# Patient Record
Sex: MALE | Race: ASIAN | NOT HISPANIC OR LATINO | Employment: FULL TIME | URBAN - METROPOLITAN AREA
[De-identification: names, ages, dates, MRNs, and addresses within clinical notes are randomized per-mention and may not be internally consistent; named-entity substitution may affect disease eponyms.]

---

## 2023-06-09 ENCOUNTER — ANCILLARY PROCEDURE (OUTPATIENT)
Dept: GENERAL RADIOLOGY | Facility: CLINIC | Age: 28
End: 2023-06-09
Attending: PHYSICIAN ASSISTANT

## 2023-06-09 ENCOUNTER — OFFICE VISIT (OUTPATIENT)
Dept: URGENT CARE | Facility: URGENT CARE | Age: 28
End: 2023-06-09

## 2023-06-09 VITALS
DIASTOLIC BLOOD PRESSURE: 112 MMHG | SYSTOLIC BLOOD PRESSURE: 165 MMHG | TEMPERATURE: 97.4 F | OXYGEN SATURATION: 100 % | WEIGHT: 249 LBS | HEART RATE: 75 BPM | RESPIRATION RATE: 16 BRPM

## 2023-06-09 DIAGNOSIS — M54.50 ACUTE RIGHT-SIDED LOW BACK PAIN, UNSPECIFIED WHETHER SCIATICA PRESENT: Primary | ICD-10-CM

## 2023-06-09 DIAGNOSIS — R20.2 BILATERAL LEG PARESTHESIA: ICD-10-CM

## 2023-06-09 DIAGNOSIS — R03.0 ELEVATED BLOOD PRESSURE READING WITHOUT DIAGNOSIS OF HYPERTENSION: ICD-10-CM

## 2023-06-09 DIAGNOSIS — M54.50 ACUTE RIGHT-SIDED LOW BACK PAIN, UNSPECIFIED WHETHER SCIATICA PRESENT: ICD-10-CM

## 2023-06-09 PROCEDURE — 72100 X-RAY EXAM L-S SPINE 2/3 VWS: CPT | Mod: TC | Performed by: RADIOLOGY

## 2023-06-09 PROCEDURE — 99203 OFFICE O/P NEW LOW 30 MIN: CPT | Performed by: PHYSICIAN ASSISTANT

## 2023-06-09 RX ORDER — METHOCARBAMOL 500 MG/1
500 TABLET, FILM COATED ORAL
Qty: 20 TABLET | Refills: 0 | Status: SHIPPED | OUTPATIENT
Start: 2023-06-09 | End: 2023-06-29

## 2023-06-09 RX ORDER — METHYLPREDNISOLONE 4 MG
TABLET, DOSE PACK ORAL
Qty: 21 TABLET | Refills: 0 | Status: SHIPPED | OUTPATIENT
Start: 2023-06-09 | End: 2024-07-26

## 2023-06-09 ASSESSMENT — PAIN SCALES - GENERAL: PAINLEVEL: SEVERE PAIN (6)

## 2023-06-09 NOTE — PROGRESS NOTES
X-rays-I see some angulation convexity at the right L1-2 level.  Lateral shows bone spurs at multiple levels with degenerative changes.      Results for orders placed or performed in visit on 06/09/23   XR Lumbar Spine 2/3 Views     Status: None (Preliminary result)    Narrative    LUMBAR SPINE 2/3 VIEWS 6/9/2023 11:44 AM    INDICATION: Bilateral leg paresthesia; Acute right-sided low back  pain, unspecified whether sciatica present.    COMPARISON: None      Impression    IMPRESSION: Five lumbar vertebral bodies. Chronic appearing vertebral  body height loss at L1 and L2 where there is convex right angulation  without subluxation. No definite acute fracture. Mild to moderate  interspace and endplate degeneration at T12-L1 and L1-L2. Mild  interspace and endplate degeneration L2-L3 and L3-L4. Unremarkable  facets.         Impression:     ICD-10-CM    1. Acute right-sided low back pain, unspecified whether sciatica present  M54.50 methylPREDNISolone (MEDROL DOSEPAK) 4 MG tablet therapy pack     methocarbamol (ROBAXIN) 500 MG tablet     XR Lumbar Spine 2/3 Views     Orthopedic  Referral     CANCELED: Orthopedic  Referral      2. Bilateral leg paresthesia  R20.2 methylPREDNISolone (MEDROL DOSEPAK) 4 MG tablet therapy pack     methocarbamol (ROBAXIN) 500 MG tablet     XR Lumbar Spine 2/3 Views     Orthopedic  Referral     CANCELED: Orthopedic  Referral      3. Elevated blood pressure reading without diagnosis of hypertension  R03.0             Plan: Acute right sided low back pain with some bilateral leg paresthesias down to the anterior shins over the last 2 days.  Some intermittent low back pain since September 2022.  Neurologically intact.  Differential includes but is not limited to facet syndrome, disc bulge, disc herniation, stenosis, lumbar sprain/strain.  Medrol Dosepak, muscle relaxant at bedtime.  Referral for follow-up with Ortho in 1 to 2 weeks.  Instructions for back  care and return precautions discussed.      Elevated blood pressure.  Forgot to recheck.  I called patient with x-ray results and told him to be sure to follow-up.  Ortho can determine whether or not more advanced imaging is needed such as MRI.  Told him I forgot to recheck his blood pressure here today and that it was extremely elevated.  He states he has been told in the past that his blood pressure is elevated.  He denies any headaches or dizziness.  I told him to get his blood pressure rechecked outside of clinic, preferably when not on the Medrol Dosepak/steroid, and if still elevated he needs to obtain primary care provider and follow-up for it.  Explained that hypertension is the #1 cause of stroke.  He seems to understand the importance of it.     Margarita Khan PA-C      SUBJECTIVE:  37 yo male presents acute right-sided low back pain with paresthesias both legs for the past 2 days.  He has had some intermittent right-sided low back pain since September 2022 but it has been worse over the last month with new onset of the leg symptoms over the past 2 days.  Aleve does help somewhat.  Massage really did not help.  No specific injury.  Is a  and does bending, twisting, lifting all day long.  No night sweats, fever, weight loss.  No bowel or bladder trouble.  He can tell when he has to go to the bathroom and is able to get there on time.  He gets tingling in the lateral thighs and and anterior shins.  Pain is worse with lifting, bending, twisting or lying flat on his back.        Not on File    No past medical history on file.    No current outpatient medications on file prior to visit.  No current facility-administered medications on file prior to visit.      No past surgical history on file.    Social History     Socioeconomic History     Marital status: Single     Spouse name: Not on file     Number of children: Not on file     Years of education: Not on file     Highest education level:  Not on file   Occupational History     Not on file   Tobacco Use     Smoking status: Not on file     Smokeless tobacco: Not on file   Substance and Sexual Activity     Alcohol use: Not on file     Drug use: Not on file     Sexual activity: Not on file   Other Topics Concern     Not on file   Social History Narrative     Not on file     Social Determinants of Health     Financial Resource Strain: Not on file   Food Insecurity: Not on file   Transportation Needs: Not on file   Physical Activity: Not on file   Stress: Not on file   Social Connections: Not on file   Intimate Partner Violence: Not on file   Housing Stability: Not on file       REVIEW OF SYSTEMS  General: negative for fever  Resp: negative for chest pain   CV: negative for chest pain  : negative for dysuria , incontinence, frequency  Musculoskeletal: as above  Neurologic: negative for ataxia, saddle anesthesia, fecal incontinence, one sided weakness,  paresthesias    Physical Exam:  BP (!) 165/112   Pulse 75   Temp 97.4  F (36.3  C) (Tympanic)   Resp 16   Wt 112.9 kg (249 lb)   SpO2 100%     Constitutional: healthy, alert and no acute distress   CARDIO: RRR, no MRG  RESP: lungs CTA, NAD  NEURO: Patellar  and ankle reflexes intact and equal bilaterally  BACK:  Straight leg raise intact, mild localized tenderness right  L1-L3 facet area.  Full flexion and extension with increased pain with extension. Strength intact and equal bilateral lower extremities.  Negative Barbra's test.  GAIT: intact  Psychiatric: mentation appears normal and affect normal/bright        Margarita Khan PA-C

## 2023-06-09 NOTE — LETTER
June 9, 2023      Rolando Olvera  [address]  [facility] [address]        To Whom It May Concern:    Rolando Olvera was seen in our clinic. He may return to work 6/12 without restrictions. Please excuse today and 6/11/2023.      Sincerely,        Margarita Khan PA-C

## 2023-06-12 ENCOUNTER — TELEPHONE (OUTPATIENT)
Dept: NEUROSURGERY | Facility: CLINIC | Age: 28
End: 2023-06-12

## 2023-06-12 NOTE — TELEPHONE ENCOUNTER
Placed call to pt who reports slight pain to back side. No N/T to genitals, no issues with bowel or bladder.     Pt to remain scheduled as is. No other recommendations at this time.

## 2023-06-12 NOTE — TELEPHONE ENCOUNTER
Hello,  Pt answered Yes to this question.  Since you last saw your referring provider, have you had any major or unusual changes in your bowel or bladder function or any numbness or tingling in your genital area?  I dont have more information.      Hello,  I'm with ortho con.  Pt has an appt on Wed with Tila.  Pt reports RED flag symptoms saying Yes and No so I'm reporting it.  Please review.  Thank you.

## 2023-06-14 ENCOUNTER — OFFICE VISIT (OUTPATIENT)
Dept: NEUROSURGERY | Facility: CLINIC | Age: 28
End: 2023-06-14

## 2023-06-14 VITALS — DIASTOLIC BLOOD PRESSURE: 104 MMHG | HEART RATE: 55 BPM | OXYGEN SATURATION: 99 % | SYSTOLIC BLOOD PRESSURE: 147 MMHG

## 2023-06-14 DIAGNOSIS — M54.50 ACUTE RIGHT-SIDED LOW BACK PAIN, UNSPECIFIED WHETHER SCIATICA PRESENT: ICD-10-CM

## 2023-06-14 DIAGNOSIS — R20.2 BILATERAL LEG PARESTHESIA: ICD-10-CM

## 2023-06-14 PROCEDURE — 99203 OFFICE O/P NEW LOW 30 MIN: CPT | Performed by: PHYSICIAN ASSISTANT

## 2023-06-14 ASSESSMENT — PAIN SCALES - GENERAL: PAINLEVEL: MILD PAIN (3)

## 2023-06-14 NOTE — LETTER
6/14/2023         RE: Rolando Olvera  [address]  [facility] [address]        Dear Colleague,    Thank you for referring your patient, Rolando Olvera, to the [facility]. Please see a copy of my visit note below.    Neurosurgery Consult    HPI    Mr. Olvera is a 28-year-old male who presents to clinic for a 5-day history of back pain and some left leg tingling.  The symptoms have mostly resolved now that he has been on a Medrol Dosepak and just finished today.  He denies any weakness or any residual numbness at this time, his back pain is almost gone.  Denies any bowel or bladder symptoms.  He does not do any physical therapy.  He had a lumbar x-ray.    Medical history  Obesity    Social history  Works as an [employer]       B/P: 147/104, T: Data Unavailable, P: 55, R: Data Unavailable       Exam    Alert and oriented no acute distress  Bilateral lower extremities with 5/5 strength  Reflexes 2+ patella/ankle  Negative straight leg raise bilaterally  Negative ankle clonus negative Babinski bilaterally  Lumbar spine nontender to palpation  Able to stand on heels and toes  Gait is normal    Imaging    Lumbar x-ray demonstrated    IMPRESSION: Five lumbar vertebral bodies. Chronic appearing vertebral  body height loss at L1 and L2 where there is convex right angulation  without subluxation. No definite acute fracture. Mild to moderate  interspace and endplate degeneration at T12-L1 and L1-L2. Mild  interspace and endplate degeneration L2-L3 and L3-L4. Unremarkable  facets.    Assessment    Back pain and left leg radicular symptoms    Plan:      Given the symptoms are nearly resolved, no further therapy or follow-up is needed at this time, if his symptoms return over the next few weeks when he is off the steroids, physical therapy would certainly be reasonable, or if he is having worsening radicular symptoms then an MRI may be obtained.          Again, thank you for allowing me  to participate in the care of your patient.        Sincerely,        Tila Cleary PA-C

## 2023-06-14 NOTE — NURSING NOTE
Rolando Olvera is a 28 year old male who presents for:  Chief Complaint   Patient presents with     Consult     Acute right sided low back pain         Initial Vitals:  BP (!) 147/104   Pulse 55   SpO2 99%  There is no height or weight on file to calculate BMI.. There is no height or weight on file to calculate BSA. BP completed using cuff size: large  Mild Pain (3)    Nursing Comments:     Cristo Garcia MA

## 2023-06-14 NOTE — PROGRESS NOTES
Neurosurgery Consult    HPI    Mr. Olvera is a 28-year-old male who presents to clinic for a 5-day history of back pain and some left leg tingling.  The symptoms have mostly resolved now that he has been on a Medrol Dosepak and just finished today.  He denies any weakness or any residual numbness at this time, his back pain is almost gone.  Denies any bowel or bladder symptoms.  He does not do any physical therapy.  He had a lumbar x-ray.    Medical history  Obesity    Social history  Works as an [employer]       B/P: 147/104, T: Data Unavailable, P: 55, R: Data Unavailable       Exam    Alert and oriented no acute distress  Bilateral lower extremities with 5/5 strength  Reflexes 2+ patella/ankle  Negative straight leg raise bilaterally  Negative ankle clonus negative Babinski bilaterally  Lumbar spine nontender to palpation  Able to stand on heels and toes  Gait is normal    Imaging    Lumbar x-ray demonstrated    IMPRESSION: Five lumbar vertebral bodies. Chronic appearing vertebral  body height loss at L1 and L2 where there is convex right angulation  without subluxation. No definite acute fracture. Mild to moderate  interspace and endplate degeneration at T12-L1 and L1-L2. Mild  interspace and endplate degeneration L2-L3 and L3-L4. Unremarkable  facets.    Assessment    Back pain and left leg radicular symptoms    Plan:      Given the symptoms are nearly resolved, no further therapy or follow-up is needed at this time, if his symptoms return over the next few weeks when he is off the steroids, physical therapy would certainly be reasonable, or if he is having worsening radicular symptoms then an MRI may be obtained.

## 2023-08-13 ENCOUNTER — HEALTH MAINTENANCE LETTER (OUTPATIENT)
Age: 28
End: 2023-08-13

## 2024-06-06 ENCOUNTER — OFFICE VISIT (OUTPATIENT)
Dept: URGENT CARE | Facility: URGENT CARE | Age: 29
End: 2024-06-06
Payer: COMMERCIAL

## 2024-06-06 VITALS
HEART RATE: 90 BPM | RESPIRATION RATE: 16 BRPM | SYSTOLIC BLOOD PRESSURE: 161 MMHG | DIASTOLIC BLOOD PRESSURE: 111 MMHG | TEMPERATURE: 97.9 F | WEIGHT: 270.6 LBS | OXYGEN SATURATION: 96 %

## 2024-06-06 DIAGNOSIS — M25.571 ACUTE RIGHT ANKLE PAIN: Primary | ICD-10-CM

## 2024-06-06 PROCEDURE — 84550 ASSAY OF BLOOD/URIC ACID: CPT | Performed by: PHYSICIAN ASSISTANT

## 2024-06-06 PROCEDURE — 99213 OFFICE O/P EST LOW 20 MIN: CPT | Performed by: PHYSICIAN ASSISTANT

## 2024-06-06 PROCEDURE — 36415 COLL VENOUS BLD VENIPUNCTURE: CPT | Performed by: PHYSICIAN ASSISTANT

## 2024-06-06 RX ORDER — COLCHICINE 0.6 MG/1
0.6 TABLET ORAL DAILY
Qty: 30 TABLET | Refills: 0 | Status: SHIPPED | OUTPATIENT
Start: 2024-06-06 | End: 2024-07-26

## 2024-06-06 ASSESSMENT — ENCOUNTER SYMPTOMS
COUGH: 0
GASTROINTESTINAL NEGATIVE: 1
HEADACHES: 0
VOMITING: 0
MYALGIAS: 0
PALPITATIONS: 0
CONSTITUTIONAL NEGATIVE: 1
DIARRHEA: 0
DYSURIA: 0
SHORTNESS OF BREATH: 0
FEVER: 0
WHEEZING: 0
CHILLS: 0
RESPIRATORY NEGATIVE: 1
SORE THROAT: 0
FREQUENCY: 0
NEUROLOGICAL NEGATIVE: 1
CHEST TIGHTNESS: 0
NAUSEA: 0
HEMATURIA: 0
ARTHRALGIAS: 1
ABDOMINAL PAIN: 0
ALLERGIC/IMMUNOLOGIC NEGATIVE: 1
CARDIOVASCULAR NEGATIVE: 1

## 2024-06-06 ASSESSMENT — PAIN SCALES - GENERAL: PAINLEVEL: MODERATE PAIN (4)

## 2024-06-06 NOTE — PROGRESS NOTES
Chief Complaint:     Chief Complaint   Patient presents with    Ankle Pain     Patient reports right ankle pain beginning when he woke up on Monday morning.      ASSESSMENT     1. Acute right ankle pain       PLAN    Will get uric acid today.  Rx for Colchicine sent in.  RICE discussed  Recommended rest and avoidance of activities which cause pain or swelling.  Pain relief: Acetaminophen and or Ibuprofen with food.  Patient verbalized understanding, and agrees with this plan.    HPI: Rolando Olvera is an 29 year old male who presents today for evaluation of R ankle pain.  Patient woke up with the pain 3 days ago.  He does not recall any acute injury.  He has not tried anything for the pain.  Pain is worse with weight bearing.  He is able to walk on the R ankle.      Patient denies any numbness, tingling, or dysfunction of the R leg.    ROS:      Review of Systems   Constitutional: Negative.  Negative for chills and fever.   HENT: Negative.  Negative for sore throat.    Respiratory: Negative.  Negative for cough, chest tightness, shortness of breath and wheezing.    Cardiovascular: Negative.  Negative for chest pain and palpitations.   Gastrointestinal: Negative.  Negative for abdominal pain, diarrhea, nausea and vomiting.   Genitourinary:  Negative for dysuria, frequency, hematuria and urgency.   Musculoskeletal:  Positive for arthralgias. Negative for myalgias.   Skin:  Negative for rash.   Allergic/Immunologic: Negative.  Negative for immunocompromised state.   Neurological: Negative.  Negative for headaches.        Problem history  There is no problem list on file for this patient.       Allergies  No Known Allergies     Smoking History  History   Smoking Status    Every Day    Types: Vaping Device   Smokeless Tobacco    Never        Current Meds    Current Outpatient Medications:     colchicine (COLCRYS) 0.6 MG tablet, Take 1 tablet (0.6 mg) by mouth daily, Disp: 30 tablet, Rfl: 0    methylPREDNISolone (MEDROL  DOSEPAK) 4 MG tablet therapy pack, Follow Package Directions (Patient not taking: Reported on 6/6/2024), Disp: 21 tablet, Rfl: 0        Vital signs reviewed by Martínez Winslow PA-C  BP (!) 161/111 (BP Location: Left arm, Patient Position: Sitting, Cuff Size: Adult Large)   Pulse 90   Temp 97.9  F (36.6  C) (Tympanic)   Resp 16   Wt 122.7 kg (270 lb 9.6 oz)   SpO2 96%     Physical Exam     Physical Exam  Vitals and nursing note reviewed.   Constitutional:       General: He is not in acute distress.     Appearance: He is well-developed. He is not ill-appearing, toxic-appearing or diaphoretic.   HENT:      Head: Normocephalic and atraumatic.      Right Ear: Hearing, tympanic membrane, ear canal and external ear normal. Tympanic membrane is not perforated, erythematous, retracted or bulging.      Left Ear: Hearing, tympanic membrane, ear canal and external ear normal. Tympanic membrane is not perforated, erythematous, retracted or bulging.      Nose: Nose normal. No mucosal edema, congestion or rhinorrhea.      Mouth/Throat:      Pharynx: No oropharyngeal exudate or posterior oropharyngeal erythema.      Tonsils: No tonsillar exudate or tonsillar abscesses. 0 on the right. 0 on the left.   Eyes:      Pupils: Pupils are equal, round, and reactive to light.   Cardiovascular:      Rate and Rhythm: Normal rate and regular rhythm.      Heart sounds: Normal heart sounds, S1 normal and S2 normal. Heart sounds not distant. No murmur heard.     No friction rub. No gallop.   Pulmonary:      Effort: Pulmonary effort is normal. No respiratory distress.      Breath sounds: Normal breath sounds. No decreased breath sounds, wheezing, rhonchi or rales.   Abdominal:      General: Bowel sounds are normal. There is no distension.      Palpations: Abdomen is soft.      Tenderness: There is no abdominal tenderness.   Musculoskeletal:      Cervical back: Normal range of motion and neck supple.      Right ankle: Swelling present. No  deformity or ecchymosis. Tenderness present. Normal range of motion.        Feet:    Lymphadenopathy:      Cervical: No cervical adenopathy.   Skin:     General: Skin is warm and dry.      Findings: No rash.   Neurological:      Mental Status: He is alert.      Cranial Nerves: No cranial nerve deficit.   Psychiatric:         Attention and Perception: He is attentive.         Speech: Speech normal.         Behavior: Behavior normal. Behavior is cooperative.         Thought Content: Thought content normal.         Judgment: Judgment normal.             Martínez Winslow PA-C  6/6/2024, 4:30 PM

## 2024-06-06 NOTE — LETTER
June 6, 2024      Rolando Olvera  [address]  [facility] [address]        To Whom It May Concern:    Rolando Olvera  was seen on 6/6/2024.  He may return to work on 6/10/2024 with no restrictions.          Sincerely,        Martínez Winslow PA-C

## 2024-06-07 LAB — URATE SERPL-MCNC: 7.4 MG/DL (ref 3.4–7)

## 2024-07-12 ENCOUNTER — ANCILLARY PROCEDURE (OUTPATIENT)
Dept: GENERAL RADIOLOGY | Facility: CLINIC | Age: 29
End: 2024-07-12
Attending: PHYSICIAN ASSISTANT
Payer: COMMERCIAL

## 2024-07-12 ENCOUNTER — OFFICE VISIT (OUTPATIENT)
Dept: URGENT CARE | Facility: URGENT CARE | Age: 29
End: 2024-07-12
Payer: COMMERCIAL

## 2024-07-12 VITALS
SYSTOLIC BLOOD PRESSURE: 150 MMHG | HEART RATE: 77 BPM | OXYGEN SATURATION: 96 % | DIASTOLIC BLOOD PRESSURE: 90 MMHG | RESPIRATION RATE: 16 BRPM | TEMPERATURE: 97.5 F | WEIGHT: 266 LBS

## 2024-07-12 DIAGNOSIS — R03.0 ELEVATED BLOOD PRESSURE READING WITHOUT DIAGNOSIS OF HYPERTENSION: ICD-10-CM

## 2024-07-12 DIAGNOSIS — Z72.89 CURRENT EVERY DAY VAPING: ICD-10-CM

## 2024-07-12 DIAGNOSIS — M25.571 PAIN IN JOINT INVOLVING ANKLE AND FOOT, RIGHT: Primary | ICD-10-CM

## 2024-07-12 DIAGNOSIS — M25.571 PAIN IN JOINT INVOLVING ANKLE AND FOOT, RIGHT: ICD-10-CM

## 2024-07-12 LAB
BASOPHILS # BLD AUTO: 0.1 10E3/UL (ref 0–0.2)
BASOPHILS NFR BLD AUTO: 1 %
EOSINOPHIL # BLD AUTO: 0.1 10E3/UL (ref 0–0.7)
EOSINOPHIL NFR BLD AUTO: 1 %
ERYTHROCYTE [DISTWIDTH] IN BLOOD BY AUTOMATED COUNT: 11.7 % (ref 10–15)
HCT VFR BLD AUTO: 46.6 % (ref 40–53)
HGB BLD-MCNC: 15.7 G/DL (ref 13.3–17.7)
IMM GRANULOCYTES # BLD: 0 10E3/UL
IMM GRANULOCYTES NFR BLD: 0 %
LYMPHOCYTES # BLD AUTO: 1.9 10E3/UL (ref 0.8–5.3)
LYMPHOCYTES NFR BLD AUTO: 24 %
MCH RBC QN AUTO: 29.4 PG (ref 26.5–33)
MCHC RBC AUTO-ENTMCNC: 33.7 G/DL (ref 31.5–36.5)
MCV RBC AUTO: 87 FL (ref 78–100)
MONOCYTES # BLD AUTO: 0.6 10E3/UL (ref 0–1.3)
MONOCYTES NFR BLD AUTO: 7 %
NEUTROPHILS # BLD AUTO: 5.3 10E3/UL (ref 1.6–8.3)
NEUTROPHILS NFR BLD AUTO: 67 %
PLATELET # BLD AUTO: 199 10E3/UL (ref 150–450)
RBC # BLD AUTO: 5.34 10E6/UL (ref 4.4–5.9)
WBC # BLD AUTO: 7.9 10E3/UL (ref 4–11)

## 2024-07-12 PROCEDURE — 86140 C-REACTIVE PROTEIN: CPT | Performed by: PHYSICIAN ASSISTANT

## 2024-07-12 PROCEDURE — 99214 OFFICE O/P EST MOD 30 MIN: CPT | Performed by: PHYSICIAN ASSISTANT

## 2024-07-12 PROCEDURE — 80061 LIPID PANEL: CPT | Performed by: PHYSICIAN ASSISTANT

## 2024-07-12 PROCEDURE — 36415 COLL VENOUS BLD VENIPUNCTURE: CPT | Performed by: PHYSICIAN ASSISTANT

## 2024-07-12 PROCEDURE — 84550 ASSAY OF BLOOD/URIC ACID: CPT | Performed by: PHYSICIAN ASSISTANT

## 2024-07-12 PROCEDURE — 85025 COMPLETE CBC W/AUTO DIFF WBC: CPT | Performed by: PHYSICIAN ASSISTANT

## 2024-07-12 PROCEDURE — 73610 X-RAY EXAM OF ANKLE: CPT | Mod: TC | Performed by: RADIOLOGY

## 2024-07-12 PROCEDURE — 73630 X-RAY EXAM OF FOOT: CPT | Mod: TC | Performed by: RADIOLOGY

## 2024-07-12 PROCEDURE — 80053 COMPREHEN METABOLIC PANEL: CPT | Performed by: PHYSICIAN ASSISTANT

## 2024-07-12 RX ORDER — METHYLPREDNISOLONE 4 MG
TABLET, DOSE PACK ORAL
Qty: 21 TABLET | Refills: 0 | Status: SHIPPED | OUTPATIENT
Start: 2024-07-12 | End: 2024-07-26

## 2024-07-12 ASSESSMENT — PAIN SCALES - GENERAL: PAINLEVEL: MILD PAIN (2)

## 2024-07-12 NOTE — PROGRESS NOTES
"   Chief Complaint   Patient presents with    Foot Pain     Right foot pain for one month. Patient states he has been taking ibuprofen for pain; states ibuprofen helps \"a little bit.\"           ASSESSMENT:    ICD-10-CM    1. Pain in joint involving ankle and foot, right  M25.571 XR Ankle Right G/E 3 Views     XR Foot Right G/E 3 Views     CBC with platelets and differential     Comprehensive metabolic panel (BMP + Alb, Alk Phos, ALT, AST, Total. Bili, TP)     CRP, inflammation     Uric acid     methylPREDNISolone (MEDROL DOSEPAK) 4 MG tablet therapy pack     Lipid panel reflex to direct LDL Non-fasting     CBC with platelets and differential     Comprehensive metabolic panel (BMP + Alb, Alk Phos, ALT, AST, Total. Bili, TP)     CRP, inflammation     Uric acid     Lipid panel reflex to direct LDL Non-fasting      2. Elevated blood pressure reading without diagnosis of hypertension  R03.0 XR Ankle Right G/E 3 Views     XR Foot Right G/E 3 Views     CBC with platelets and differential     Comprehensive metabolic panel (BMP + Alb, Alk Phos, ALT, AST, Total. Bili, TP)     CRP, inflammation     Uric acid     methylPREDNISolone (MEDROL DOSEPAK) 4 MG tablet therapy pack     Lipid panel reflex to direct LDL Non-fasting     CBC with platelets and differential     Comprehensive metabolic panel (BMP + Alb, Alk Phos, ALT, AST, Total. Bili, TP)     CRP, inflammation     Uric acid     Lipid panel reflex to direct LDL Non-fasting      3. Current every day vaping  Z72.89 XR Ankle Right G/E 3 Views     XR Foot Right G/E 3 Views     CBC with platelets and differential     Comprehensive metabolic panel (BMP + Alb, Alk Phos, ALT, AST, Total. Bili, TP)     CRP, inflammation     Uric acid     methylPREDNISolone (MEDROL DOSEPAK) 4 MG tablet therapy pack     Lipid panel reflex to direct LDL Non-fasting     CBC with platelets and differential     Comprehensive metabolic panel (BMP + Alb, Alk Phos, ALT, AST, Total. Bili, TP)     CRP, " inflammation     Uric acid     Lipid panel reflex to direct LDL Non-fasting            PLAN: Persistent right lateral foot and dorsal midfoot pain and swelling.  No injury.  In addition elevated blood pressure in person who vapes nicotine daily..  Does not have primary care provider.  Lipid panel, uric acid, CRP, comprehensive metabolic panel, CBC.  Medrol Dosepak.  Sent to  to obtain primary care provider and follow-up 2 to 3 weeks for foot and ankle, blood work, blood pressure.  Discussed importance of follow-up as hypertension can be the #1 cause of stroke.    Margarita Khan PA-C        SUBJECTIVE:  Rolando Olvera is an 29 year old male  presents with left lateral ankle and mid dorsal foot pain and swelling.  Seen 6/6/2024.  Given Colcrys for 30 days.  No help.  Uric acid was just slightly elevated at 7.4.  Also noted today to have elevated blood pressure.  Review full sheet shows several other elevated blood pressures.  Denies headache or dizziness.  Vapes nicotine daily.    No past medical history on file.  History   Smoking Status    Every Day    Types: Vaping Device   Smokeless Tobacco    Never       ROS:  GEN no fevers  SKIN no erythema  Musculoskeletal:  See HPI.      OBJECTIVE:  Blood pressure (!) 143/101, pulse 77, temperature 97.5  F (36.4  C), temperature source Tympanic, resp. rate 16, weight 120.7 kg (266 lb), SpO2 96%.  Repeat manual blood pressure 150/90  Patient is alert and NAD.  EYES: conjunctiva clear  Ankle/foot exam (right):  Inspection/palpation: Left lateral ankle is swollen and tender.  Also tender mid dorsal foot.  No redness.  Cap refill intact.    Good doralis pedis.  Neurovascularly Intact Distally.         Margarita Khan PA-C

## 2024-07-12 NOTE — LETTER
July 12, 2024      Rolando Olvera  [address]  [facility] [address]        To Whom It May Concern:    Rolando Olvera  was seen on 7/12/2024.  Please excuse him from work today.     Sincerely,        Margarita Khan PA-C

## 2024-07-13 LAB
ALBUMIN SERPL BCG-MCNC: 4.7 G/DL (ref 3.5–5.2)
ALP SERPL-CCNC: 66 U/L (ref 40–150)
ALT SERPL W P-5'-P-CCNC: 31 U/L (ref 0–70)
ANION GAP SERPL CALCULATED.3IONS-SCNC: 11 MMOL/L (ref 7–15)
AST SERPL W P-5'-P-CCNC: 28 U/L (ref 0–45)
BILIRUB SERPL-MCNC: 1.4 MG/DL
BUN SERPL-MCNC: 11.3 MG/DL (ref 6–20)
CALCIUM SERPL-MCNC: 9.5 MG/DL (ref 8.6–10)
CHLORIDE SERPL-SCNC: 102 MMOL/L (ref 98–107)
CHOLEST SERPL-MCNC: 153 MG/DL
CREAT SERPL-MCNC: 0.94 MG/DL (ref 0.67–1.17)
CRP SERPL-MCNC: <3 MG/L
DEPRECATED HCO3 PLAS-SCNC: 24 MMOL/L (ref 22–29)
EGFRCR SERPLBLD CKD-EPI 2021: >90 ML/MIN/1.73M2
FASTING STATUS PATIENT QL REPORTED: NO
FASTING STATUS PATIENT QL REPORTED: NO
GLUCOSE SERPL-MCNC: 88 MG/DL (ref 70–99)
HDLC SERPL-MCNC: 42 MG/DL
LDLC SERPL CALC-MCNC: 83 MG/DL
NONHDLC SERPL-MCNC: 111 MG/DL
POTASSIUM SERPL-SCNC: 4.3 MMOL/L (ref 3.4–5.3)
PROT SERPL-MCNC: 7.6 G/DL (ref 6.4–8.3)
SODIUM SERPL-SCNC: 137 MMOL/L (ref 135–145)
TRIGL SERPL-MCNC: 142 MG/DL
URATE SERPL-MCNC: 8.9 MG/DL (ref 3.4–7)

## 2024-07-26 ENCOUNTER — OFFICE VISIT (OUTPATIENT)
Dept: FAMILY MEDICINE | Facility: CLINIC | Age: 29
End: 2024-07-26
Payer: COMMERCIAL

## 2024-07-26 VITALS
OXYGEN SATURATION: 100 % | WEIGHT: 263 LBS | TEMPERATURE: 98.1 F | DIASTOLIC BLOOD PRESSURE: 96 MMHG | RESPIRATION RATE: 16 BRPM | SYSTOLIC BLOOD PRESSURE: 150 MMHG | HEART RATE: 59 BPM

## 2024-07-26 DIAGNOSIS — M10.071 IDIOPATHIC GOUT OF RIGHT ANKLE, UNSPECIFIED CHRONICITY: Primary | ICD-10-CM

## 2024-07-26 DIAGNOSIS — I10 ESSENTIAL HYPERTENSION: ICD-10-CM

## 2024-07-26 PROCEDURE — 90715 TDAP VACCINE 7 YRS/> IM: CPT

## 2024-07-26 PROCEDURE — 99214 OFFICE O/P EST MOD 30 MIN: CPT | Mod: 25

## 2024-07-26 PROCEDURE — 84550 ASSAY OF BLOOD/URIC ACID: CPT

## 2024-07-26 PROCEDURE — 90471 IMMUNIZATION ADMIN: CPT

## 2024-07-26 PROCEDURE — 36415 COLL VENOUS BLD VENIPUNCTURE: CPT

## 2024-07-26 RX ORDER — LISINOPRIL 10 MG/1
10 TABLET ORAL DAILY
Qty: 60 TABLET | Refills: 0 | Status: SHIPPED | OUTPATIENT
Start: 2024-07-26 | End: 2024-08-30

## 2024-07-26 ASSESSMENT — PAIN SCALES - GENERAL: PAINLEVEL: NO PAIN (0)

## 2024-07-26 NOTE — PATIENT INSTRUCTIONS
At [facility], we strive to deliver an exceptional experience to you, every time we see you. If you receive a survey, please let us know what we are doing well and/or what we could improve upon, as we do value your feedback.  If you have MyChart, you can expect to receive results automatically within 24 hours of their completion.  Your provider will send a note interpreting your results as well.   If you do not have MyChart, you should receive your results in about a week by mail.    Your care team:                            Family Medicine Internal Medicine   MD Jeremiah Nuñez, MD Fe Moon, MD Marshall Dias, MD Marielos Ma, PAThadC    Shaquille Villa, MD Pediatrics   Denisa Connelly, MD Clarisse Busby, MD Caitlyn Humphrey, APRN CNP Ginny Funez APRN CNP   MD Polly Cobos, MD Marjorie Tripp, CNP     Ronald Dupree, CNP Same-Day Provider (No follow-up visits)   MARIO Hamilton, DNP Victoria Bolivar, MARIO Reilly, FNP, BC NICOLA PorrasC     Clinic hours: Monday - Thursday 7 am-6 pm; Fridays 7 am-5 pm.   Urgent care: Monday - Friday 10 am- 8 pm; Saturday and Sunday 9 am-5 pm.    Clinic: [phone]       [facility]: Monday - Thursday 8 am - 7 pm; Friday 8 am - 6 pm  [facility]: [phone]

## 2024-07-26 NOTE — PROGRESS NOTES
Assessment & Plan     Idiopathic gout of right ankle, unspecified chronicity  - UC visits reviewed from 6/6/24 and 7/12/24, including labs and XR.   - Pain has resolved with treatment. No swelling or redness in joint.  - Discussed gout treatment- will recheck uric acid to ensure trending down. Patient would like to avoid chronic medication if possible.  - Discussed low-purine diet and prevention of gout flares. Handout included in AVS.  - Uric acid    Essential hypertension  - BP has been persistently elevated, patient reports that this is chronic and is open to starting medication.  -  Discussed dietary modifications including DASH and Mediterranean diets, limitation of sodium and unhealthy fats.   - Recommended exercise of at least 150 minutes/week, gradual increase in duration and intensity of physical activity as tolerated.  - Recommended home blood pressure monitoring. Patient to bring log to follow up appointment.   - Discussed side effects of prescribed medications, including dizziness, cough, changes in lab values and monitoring medication  - lisinopril (ZESTRIL) 10 MG tablet  Dispense: 60 tablet; Refill: 0    MED REC REQUIRED  Post Medication Reconciliation Status:  Discharge medications reconciled and changed, see notes/orders    Nicotine/Tobacco Cessation  He reports that he has been smoking vaping device. He has never been exposed to tobacco smoke. He has never used smokeless tobacco.  Nicotine/Tobacco Cessation Plan  Information offered: Patient not interested at this time    Follow up in one month: Scheduled preventive exam. Repeat BMP at that time.     Charles Marshall is a 29 year old, presenting for the following health issues:  Hospital F/U (Right foot ) and Hypertension    History of Present Illness       Hypertension: He presents for follow up of hypertension.  He does not check blood pressure  regularly outside of the clinic. Outside blood pressures have been over 140/90. He does not follow  a low salt diet.     Reason for visit:  Check up on my right foot/ankle from gout pain    He eats 0-1 servings of fruits and vegetables daily.He consumes 1 sweetened beverage(s) daily.He exercises with enough effort to increase his heart rate 9 or less minutes per day.  He exercises with enough effort to increase his heart rate 3 or less days per week.   He is taking medications regularly.     ED/UC Followup:    Facility:  [facility]  Date of visit: 07/12/2024  Reason for visit: Right foot pain   Current Status: foot is better    UC notes reviewed. Pain has resolved following steroid taper. No longer on any medications. BP elevated.     Review of Systems  Constitutional, HEENT, cardiovascular, pulmonary, gi and gu systems are negative, except as otherwise noted.      Objective    BP (!) 150/96 (BP Location: Right arm, Patient Position: Sitting, Cuff Size: Adult Large)   Pulse 59   Temp 98.1  F (36.7  C) (Oral)   Resp 16   Wt 119.3 kg (263 lb)   SpO2 100%   There is no height or weight on file to calculate BMI.    Physical Exam   GENERAL: alert and no distress  NECK: no adenopathy, no asymmetry, masses, or scars  RESP: lungs clear to auscultation - no rales, rhonchi or wheezes  CV: regular rate and rhythm, normal S1 S2, no S3 or S4, no murmur, click or rub, no peripheral edema  ABDOMEN: soft, nontender, no hepatosplenomegaly, no masses and bowel sounds normal  MS: no gross musculoskeletal defects noted, no edema  SKIN: no suspicious lesions or rashes  PSYCH: mentation appears normal, affect normal/bright    Signed Electronically by: MARIO Lema CNP

## 2024-07-27 LAB — URATE SERPL-MCNC: 8.6 MG/DL (ref 3.4–7)

## 2024-08-30 ENCOUNTER — OFFICE VISIT (OUTPATIENT)
Dept: FAMILY MEDICINE | Facility: CLINIC | Age: 29
End: 2024-08-30
Payer: COMMERCIAL

## 2024-08-30 VITALS
SYSTOLIC BLOOD PRESSURE: 126 MMHG | TEMPERATURE: 97.5 F | RESPIRATION RATE: 20 BRPM | WEIGHT: 260.6 LBS | DIASTOLIC BLOOD PRESSURE: 84 MMHG | BODY MASS INDEX: 40.9 KG/M2 | HEART RATE: 90 BPM | OXYGEN SATURATION: 99 % | HEIGHT: 67 IN

## 2024-08-30 DIAGNOSIS — Z11.4 SCREENING FOR HIV (HUMAN IMMUNODEFICIENCY VIRUS): ICD-10-CM

## 2024-08-30 DIAGNOSIS — Z11.59 NEED FOR HEPATITIS C SCREENING TEST: ICD-10-CM

## 2024-08-30 DIAGNOSIS — I10 ESSENTIAL HYPERTENSION: ICD-10-CM

## 2024-08-30 DIAGNOSIS — M10.071 IDIOPATHIC GOUT OF RIGHT ANKLE, UNSPECIFIED CHRONICITY: ICD-10-CM

## 2024-08-30 DIAGNOSIS — Z00.00 ROUTINE GENERAL MEDICAL EXAMINATION AT A HEALTH CARE FACILITY: Primary | ICD-10-CM

## 2024-08-30 LAB
ANION GAP SERPL CALCULATED.3IONS-SCNC: 12 MMOL/L (ref 7–15)
BUN SERPL-MCNC: 13.1 MG/DL (ref 6–20)
CALCIUM SERPL-MCNC: 9.7 MG/DL (ref 8.8–10.4)
CHLORIDE SERPL-SCNC: 105 MMOL/L (ref 98–107)
CREAT SERPL-MCNC: 0.96 MG/DL (ref 0.67–1.17)
EGFRCR SERPLBLD CKD-EPI 2021: >90 ML/MIN/1.73M2
GLUCOSE SERPL-MCNC: 82 MG/DL (ref 70–99)
HCO3 SERPL-SCNC: 26 MMOL/L (ref 22–29)
HCV AB SERPL QL IA: NONREACTIVE
HIV 1+2 AB+HIV1 P24 AG SERPL QL IA: NONREACTIVE
POTASSIUM SERPL-SCNC: 4.1 MMOL/L (ref 3.4–5.3)
SODIUM SERPL-SCNC: 143 MMOL/L (ref 135–145)
URATE SERPL-MCNC: 7.5 MG/DL (ref 3.4–7)

## 2024-08-30 PROCEDURE — 86803 HEPATITIS C AB TEST: CPT

## 2024-08-30 PROCEDURE — 36415 COLL VENOUS BLD VENIPUNCTURE: CPT

## 2024-08-30 PROCEDURE — 80048 BASIC METABOLIC PNL TOTAL CA: CPT

## 2024-08-30 PROCEDURE — 87389 HIV-1 AG W/HIV-1&-2 AB AG IA: CPT

## 2024-08-30 PROCEDURE — 84550 ASSAY OF BLOOD/URIC ACID: CPT

## 2024-08-30 PROCEDURE — 99395 PREV VISIT EST AGE 18-39: CPT

## 2024-08-30 RX ORDER — ALLOPURINOL 100 MG/1
100 TABLET ORAL DAILY
Qty: 90 TABLET | Refills: 1 | Status: SHIPPED | OUTPATIENT
Start: 2024-08-30

## 2024-08-30 RX ORDER — LISINOPRIL 10 MG/1
10 TABLET ORAL DAILY
Qty: 90 TABLET | Refills: 1 | Status: SHIPPED | OUTPATIENT
Start: 2024-08-30 | End: 2024-09-25

## 2024-08-30 SDOH — HEALTH STABILITY: PHYSICAL HEALTH: ON AVERAGE, HOW MANY DAYS PER WEEK DO YOU ENGAGE IN MODERATE TO STRENUOUS EXERCISE (LIKE A BRISK WALK)?: 0 DAYS

## 2024-08-30 ASSESSMENT — SOCIAL DETERMINANTS OF HEALTH (SDOH): HOW OFTEN DO YOU GET TOGETHER WITH FRIENDS OR RELATIVES?: ONCE A WEEK

## 2024-08-30 ASSESSMENT — PAIN SCALES - GENERAL: PAINLEVEL: NO PAIN (0)

## 2024-08-30 NOTE — PROGRESS NOTES
"Preventive Care Visit  [facility]  MARIO Lema CNP, Family Medicine  Aug 30, 2024    Assessment & Plan     Routine general medical examination at a health care facility  - Health maintenance and lifestyle reviewed. Updated medical and family history.  - Follow up in one year or sooner as needed.   - PRIMARY CARE FOLLOW-UP SCHEDULING    Essential hypertension  - BP in normal range today. Reports occasional dry cough, not bothersome on lisinopril. Continue at current dose.   - lisinopril (ZESTRIL) 10 MG tablet  Dispense: 90 tablet; Refill: 1  - Basic metabolic panel  (Ca, Cl, CO2, Creat, Gluc, K, Na, BUN)    Idiopathic gout of right ankle, unspecified chronicity  - No gout symptoms since starting colchicine and allopurinol. Recheck uric acid level today.  - allopurinol (ZYLOPRIM) 100 MG tablet  Dispense: 90 tablet; Refill: 1  - Uric acid    Screening for HIV (human immunodeficiency virus)  - Discussed once-lifetime screening based on USPSTF guidelines. Discussed risk factors for virus transmission. Patient accepting of screening.  - HIV Antigen Antibody Combo    Need for hepatitis C screening test  - Discussed once-lifetime screening based on USPSTF guidelines. Discussed risk factors for virus transmission. Patient accepting of screening.  - Hepatitis C Screen Reflex to HCV RNA Quant and Genotype    Patient has been advised of split billing requirements and indicates understanding: Yes    BMI  Estimated body mass index is 40.82 kg/m  as calculated from the following:    Height as of this encounter: 1.702 m (5' 7\").    Weight as of this encounter: 118.2 kg (260 lb 9.6 oz).   Weight management plan: Discussed healthy diet and exercise guidelines    Counseling  Appropriate preventive services were addressed with this patient via screening, questionnaire, or discussion as appropriate for fall prevention, nutrition, physical activity, Tobacco-use cessation, social engagement, weight " loss and cognition.  Checklist reviewing preventive services available has been given to the patient.  Reviewed patient's diet, addressing concerns and/or questions.   The patient was instructed to see the dentist every 6 months.   He is at risk for psychosocial distress and has been provided with information to reduce risk.     Due for annual physical in one year, otherwise follow up on as-needed basis.    Charles Marshall is a 29 year old, presenting for the following:  Physical        8/30/2024     1:05 PM   Additional Questions   Roomed by christina         8/30/2024     1:05 PM   Patient Reported Additional Medications   Patient reports taking the following new medications none        Health Care Directive  Patient does not have a Health Care Directive or Living Will: Information included in AVS.    HPI     BP improved on lisinopril. Reports an intermittent dry cough but is not causing concerns. Gout symptoms have improved with initiation of allopurinol and colchicine 8/7.         8/30/2024   General Health   How would you rate your overall physical health? (!) FAIR   Feel stress (tense, anxious, or unable to sleep) Only a little      (!) STRESS CONCERN      8/30/2024   Nutrition   Three or more servings of calcium each day? (!) I DON'T KNOW   Diet: Regular (no restrictions)   How many servings of fruit and vegetables per day? (!) 0-1   How many sweetened beverages each day? (!) 2          8/30/2024   Exercise   Days per week of moderate/strenous exercise 0 days   (!) EXERCISE CONCERN        8/30/2024   Social Factors   Frequency of gathering with friends or relatives Once a week   Worry food won't last until get money to buy more No   Food not last or not have enough money for food? No   Do you have housing? (Housing is defined as stable permanent housing and does not include staying ouside in a car, in a tent, in an abandoned building, in an overnight shelter, or couch-surfing.) Yes   Are you worried about  "losing your housing? No   Lack of transportation? No   Unable to get utilities (heat,electricity)? No          8/30/2024   Dental   Dentist two times every year? (!) NO          8/30/2024   TB Screening   Were you born outside of the [place]? No     Today's PHQ-2 Score:       7/26/2024     1:02 PM   PHQ-2 ( 1999 [employer])   Q1: Little interest or pleasure in doing things 0   Q2: Feeling down, depressed or hopeless 0   PHQ-2 Score 0   Q1: Little interest or pleasure in doing things Not at all   Q2: Feeling down, depressed or hopeless Not at all   PHQ-2 Score 0         8/30/2024   Substance Use   Alcohol more than 3/day or more than 7/wk Not Applicable   Do you use any other substances recreationally? No      Social History     Tobacco Use    Smoking status: Every Day     Types: Vaping Device     Passive exposure: Never    Smokeless tobacco: Never   Vaping Use    Vaping status: Every Day    Substances: Nicotine    Devices: Disposable   Substance Use Topics    Alcohol use: Not Currently    Drug use: Not Currently         8/30/2024   One time HIV Screening   Previous HIV test? No          8/30/2024   STI Screening   New sexual partner(s) since last STI/HIV test? No          8/30/2024   Contraception/Family Planning   Questions about contraception or family planning No      Reviewed and updated as needed this visit by Provider   Tobacco   Meds   Med Hx  Surg Hx  Fam Hx  Soc Hx Sexual Activity        Review of Systems  Constitutional, HEENT, cardiovascular, pulmonary, gi and gu systems are negative, except as otherwise noted.     Objective    Exam  /84 (BP Location: Left arm, Patient Position: Sitting, Cuff Size: Adult Large)   Pulse 90   Temp 97.5  F (36.4  C) (Temporal)   Resp 20   Ht 1.702 m (5' 7\")   Wt 118.2 kg (260 lb 9.6 oz)   SpO2 99%   BMI 40.82 kg/m     Estimated body mass index is 40.82 kg/m  as calculated from the following:    Height as of this encounter: 1.702 m (5' 7\").    Weight as of this " encounter: 118.2 kg (260 lb 9.6 oz).    Physical Exam  GENERAL: alert and no distress  EYES: Eyes grossly normal to inspection, PERRL and conjunctivae and sclerae normal  HENT: ear canals and TM's normal, nose and mouth without ulcers or lesions  NECK: no adenopathy, no asymmetry, masses, or scars  RESP: lungs clear to auscultation - no rales, rhonchi or wheezes  CV: regular rate and rhythm, normal S1 S2, no S3 or S4, no murmur, click or rub, no peripheral edema  ABDOMEN: soft, nontender, no hepatosplenomegaly, no masses and bowel sounds normal  MS: no gross musculoskeletal defects noted, no edema  SKIN: no suspicious lesions or rashes  NEURO: Normal strength and tone, mentation intact and speech normal  PSYCH: mentation appears normal, affect normal/bright    Signed Electronically by: MARIO Lema CNP

## 2024-08-30 NOTE — PATIENT INSTRUCTIONS
Patient Education   Preventive Care Advice   This is general advice given by our system to help you stay healthy. However, your care team may have specific advice just for you. Please talk to your care team about your preventive care needs.  Nutrition  Eat 5 or more servings of fruits and vegetables each day.  Try wheat bread, brown rice and whole grain pasta (instead of white bread, rice, and pasta).  Get enough calcium and vitamin D. Check the label on foods and aim for 100% of the RDA (recommended daily allowance).  Lifestyle  Exercise at least 150 minutes each week  (30 minutes a day, 5 days a week).  Do muscle strengthening activities 2 days a week. These help control your weight and prevent disease.  No smoking.  Wear sunscreen to prevent skin cancer.  Have a dental exam and cleaning every 6 months.  Yearly exams  See your health care team every year to talk about:  Any changes in your health.  Any medicines your care team has prescribed.  Preventive care, family planning, and ways to prevent chronic diseases.  Shots (vaccines)   HPV shots (up to age 26), if you've never had them before.  Hepatitis B shots (up to age 59), if you've never had them before.  COVID-19 shot: Get this shot when it's due.  Flu shot: Get a flu shot every year.  Tetanus shot: Get a tetanus shot every 10 years.  Pneumococcal, hepatitis A, and RSV shots: Ask your care team if you need these based on your risk.  Shingles shot (for age 50 and up)  General health tests  Diabetes screening:  Starting at age 35, Get screened for diabetes at least every 3 years.  If you are younger than age 35, ask your care team if you should be screened for diabetes.  Cholesterol test: At age 39, start having a cholesterol test every 5 years, or more often if advised.  Bone density scan (DEXA): At age 50, ask your care team if you should have this scan for osteoporosis (brittle bones).  Hepatitis C: Get tested at least once in your life.  STIs (sexually  transmitted infections)  Before age 24: Ask your care team if you should be screened for STIs.  After age 24: Get screened for STIs if you're at risk. You are at risk for STIs (including HIV) if:  You are sexually active with more than one person.  You don't use condoms every time.  You or a partner was diagnosed with a sexually transmitted infection.  If you are at risk for HIV, ask about PrEP medicine to prevent HIV.  Get tested for HIV at least once in your life, whether you are at risk for HIV or not.  Cancer screening tests  Cervical cancer screening: If you have a cervix, begin getting regular cervical cancer screening tests starting at age 21.  Breast cancer scan (mammogram): If you've ever had breasts, begin having regular mammograms starting at age 40. This is a scan to check for breast cancer.  Colon cancer screening: It is important to start screening for colon cancer at age 45.  Have a colonoscopy test every 10 years (or more often if you're at risk) Or, ask your provider about stool tests like a FIT test every year or Cologuard test every 3 years.  To learn more about your testing options, visit:   .  For help making a decision, visit:   https://bit.ly/kd36503.  Prostate cancer screening test: If you have a prostate, ask your care team if a prostate cancer screening test (PSA) at age 55 is right for you.  Lung cancer screening: If you are a current or former smoker ages 50 to 80, ask your care team if ongoing lung cancer screenings are right for you.  For informational purposes only. Not to replace the advice of your health care provider. Copyright   2023 [employer]. All rights reserved. Clinically reviewed by the [facility] Transitions Program. [employer] 197927 - REV 01/24.

## 2024-09-25 ENCOUNTER — MYC REFILL (OUTPATIENT)
Dept: FAMILY MEDICINE | Facility: CLINIC | Age: 29
End: 2024-09-25
Payer: COMMERCIAL

## 2024-09-25 DIAGNOSIS — I10 ESSENTIAL HYPERTENSION: ICD-10-CM

## 2024-09-25 RX ORDER — LISINOPRIL 10 MG/1
10 TABLET ORAL DAILY
Qty: 90 TABLET | Refills: 2 | Status: SHIPPED | OUTPATIENT
Start: 2024-09-25

## 2024-12-16 DIAGNOSIS — M10.071 IDIOPATHIC GOUT OF RIGHT ANKLE, UNSPECIFIED CHRONICITY: Primary | ICD-10-CM

## 2025-04-22 ENCOUNTER — MYC REFILL (OUTPATIENT)
Dept: FAMILY MEDICINE | Facility: CLINIC | Age: 30
End: 2025-04-22
Payer: COMMERCIAL

## 2025-04-22 DIAGNOSIS — M10.071 IDIOPATHIC GOUT OF RIGHT ANKLE, UNSPECIFIED CHRONICITY: ICD-10-CM

## 2025-04-23 RX ORDER — ALLOPURINOL 100 MG/1
100 TABLET ORAL DAILY
Qty: 90 TABLET | Refills: 0 | Status: SHIPPED | OUTPATIENT
Start: 2025-04-23

## 2025-07-31 ENCOUNTER — PATIENT OUTREACH (OUTPATIENT)
Dept: CARE COORDINATION | Facility: CLINIC | Age: 30
End: 2025-07-31
Payer: COMMERCIAL

## 2025-08-05 ENCOUNTER — TELEPHONE (OUTPATIENT)
Dept: FAMILY MEDICINE | Facility: CLINIC | Age: 30
End: 2025-08-05
Payer: COMMERCIAL

## 2025-08-14 ENCOUNTER — PATIENT OUTREACH (OUTPATIENT)
Dept: CARE COORDINATION | Facility: CLINIC | Age: 30
End: 2025-08-14
Payer: COMMERCIAL